# Patient Record
Sex: FEMALE | Race: BLACK OR AFRICAN AMERICAN
[De-identification: names, ages, dates, MRNs, and addresses within clinical notes are randomized per-mention and may not be internally consistent; named-entity substitution may affect disease eponyms.]

---

## 2018-04-06 ENCOUNTER — HOSPITAL ENCOUNTER (EMERGENCY)
Dept: HOSPITAL 17 - NED | Age: 53
Discharge: LEFT BEFORE DISCHARGE | End: 2018-04-06
Payer: SELF-PAY

## 2018-04-06 VITALS
OXYGEN SATURATION: 97 % | SYSTOLIC BLOOD PRESSURE: 155 MMHG | TEMPERATURE: 98.4 F | HEART RATE: 104 BPM | DIASTOLIC BLOOD PRESSURE: 91 MMHG | RESPIRATION RATE: 18 BRPM

## 2018-04-06 VITALS — HEIGHT: 65 IN | WEIGHT: 260.15 LBS | BODY MASS INDEX: 43.34 KG/M2

## 2018-04-06 DIAGNOSIS — M54.9: Primary | ICD-10-CM

## 2018-04-06 DIAGNOSIS — Z53.21: ICD-10-CM

## 2018-04-06 PROCEDURE — 99281 EMR DPT VST MAYX REQ PHY/QHP: CPT

## 2018-04-25 ENCOUNTER — HOSPITAL ENCOUNTER (EMERGENCY)
Dept: HOSPITAL 17 - NEPK | Age: 53
Discharge: HOME | End: 2018-04-25
Payer: SELF-PAY

## 2018-04-25 VITALS
RESPIRATION RATE: 20 BRPM | SYSTOLIC BLOOD PRESSURE: 168 MMHG | HEART RATE: 88 BPM | OXYGEN SATURATION: 96 % | TEMPERATURE: 98.8 F | DIASTOLIC BLOOD PRESSURE: 106 MMHG

## 2018-04-25 DIAGNOSIS — I10: ICD-10-CM

## 2018-04-25 DIAGNOSIS — E11.9: ICD-10-CM

## 2018-04-25 DIAGNOSIS — M54.41: Primary | ICD-10-CM

## 2018-04-25 DIAGNOSIS — G89.29: ICD-10-CM

## 2018-04-25 PROCEDURE — 99283 EMERGENCY DEPT VISIT LOW MDM: CPT

## 2018-04-25 PROCEDURE — 96372 THER/PROPH/DIAG INJ SC/IM: CPT

## 2018-04-25 NOTE — PD
HPI


Chief Complaint:  Pain: Acute or Chronic


Time Seen by Provider:  16:16


Travel History


International Travel<30 days:  No


Contact w/Intl Traveler<30days:  No


Traveled to known affect area:  No





History of Present Illness


HPI


52-year-old female with a history of chronic low back pain presents to the 

emergency room for evaluation of the pain.  States she developed acute 

exacerbation of chronic pain 2 days ago upon standing out of bed.  Since then 

she has had severe, sharp, stabbing pain to her lower back.  Radiates down her 

right lower extremity.  Pain is constant with no alleviating factors.  Worsened 

with certain range of motion.  She has been taking 800 mg ibuprofen and her 

prescribed Flexeril without relief in symptoms.  She has history of diabetes 

and hypertension.  Patient denies lower extremity paresthesias, saddle 

anesthesia, loss of bowel or bladder control, IV drug use, weight loss, night 

sweats, fever, chills, nausea, vomiting.  Patient states her primary care 

physician wants her to see a neurologist but she cannot afford to.





PFSH


Past Medical History


Diabetes:  Yes


Patient Takes Glucophage:  Yes


Hypertension:  Yes


Tetanus Vaccination:  < 5 Years


Pregnant?:  Not Pregnant





Past Surgical History


Appendectomy:  Yes


 Section:  Yes (x 3)





Social History


Alcohol Use:  No


Tobacco Use:  No


Substance Use:  No





Allergies-Medications


(Allergen,Severity, Reaction):  


Coded Allergies:  


     No Known Allergies (Unverified , 18)


Reported Meds & Prescriptions





Reported Meds & Active Scripts


Active


Active Prescriptions or Reported Medications Unobtainable    








Review of Systems


Except as stated in HPI:  all other systems reviewed are Neg





Physical Exam


Narrative


GENERAL: Well-nourished, well-developed female in no acute distress.  Afebrile.

  Ambulatory. Crying in pain 


SKIN: Focused skin assessment warm/dry.  No erythema or ecchymosis.


HEAD: Normocephalic.


EYES: No scleral icterus. No injection or drainage. 


NECK: Supple, trachea midline. No JVD or lymphadenopathy.


CARDIOVASCULAR: Regular rate and rhythm without murmurs, gallops, or rubs. 


RESPIRATORY: Breath sounds equal bilaterally. No accessory muscle use.


BACK: No CVA tenderness. No rash.  No point tenderness on palpation of the 

spine.  2+ patellar and Achilles reflexes are equal bilaterally.  Positive 

straight leg raise on the right.





Data


Data


Last Documented VS





Vital Signs








  Date Time  Temp Pulse Resp B/P (MAP) Pulse Ox O2 Delivery O2 Flow Rate FiO2


 


18 15:47 98.8 88 20 168/106 (126) 96   











MDM


Medical Decision Making


Medical Screen Exam Complete:  Yes


Emergency Medical Condition:  Yes


Medical Record Reviewed:  Yes


Differential Diagnosis


Chronic back pain, muscle spasm, strain, contusion, fracture


Narrative Course


52-year-old female with history of chronic low back pain presents to the 

emergency room for evaluation of the same.  Exacerbation started 2 days ago 

without trauma or injury.  No red flag symptoms.  No indication for imaging.  

Patient is crying in the emergency room.  She has tenderness to palpation of 

the lower lumbar region.  Positive straight leg raise on the right.  2+ 

patellar and Achilles reflexes are equal bilaterally.  Patient was informed we 

do not treat chronic pain in the emergency room.  She was given Lortab and 

Norflex and told to follow-up with her primary care physician for outpatient 

referral to pain management.  She understands and agrees to plan.





Diagnosis





 Primary Impression:  


 Chronic back pain


 Qualified Codes:  M54.41 - Lumbago with sciatica, right side; G89.29 - Other 

chronic pain


Referrals:  


Primary Care Physician





***Additional Instructions:  


Rest and drink plenty of fluids.


Take ibuprofen with food as directed, as needed for pain.


Apply ice to the affected area for 20 minutes at a time, as needed for pain and 

swelling.


Follow-up with a primary care physician.


Return to the emergency room for worsening symptoms.


***Med/Other Pt SpecificInfo:  Prescription(s) given


Scripts


Unable to Obtain Active Prescriptions or Reported Meds


Disposition:  01 DISCHARGE HOME


Condition:  Stable











Aiyana Torre 2018 16:27